# Patient Record
Sex: MALE | Race: WHITE | NOT HISPANIC OR LATINO
[De-identification: names, ages, dates, MRNs, and addresses within clinical notes are randomized per-mention and may not be internally consistent; named-entity substitution may affect disease eponyms.]

---

## 2020-02-25 PROBLEM — Z00.00 ENCOUNTER FOR PREVENTIVE HEALTH EXAMINATION: Status: ACTIVE | Noted: 2020-02-25

## 2020-03-04 ENCOUNTER — APPOINTMENT (OUTPATIENT)
Dept: OTOLARYNGOLOGY | Facility: CLINIC | Age: 65
End: 2020-03-04
Payer: COMMERCIAL

## 2020-03-04 VITALS
HEART RATE: 60 BPM | HEIGHT: 68 IN | WEIGHT: 165 LBS | DIASTOLIC BLOOD PRESSURE: 68 MMHG | BODY MASS INDEX: 25.01 KG/M2 | SYSTOLIC BLOOD PRESSURE: 126 MMHG

## 2020-03-04 DIAGNOSIS — Z80.9 FAMILY HISTORY OF MALIGNANT NEOPLASM, UNSPECIFIED: ICD-10-CM

## 2020-03-04 DIAGNOSIS — H61.21 IMPACTED CERUMEN, RIGHT EAR: ICD-10-CM

## 2020-03-04 DIAGNOSIS — H93.13 TINNITUS, BILATERAL: ICD-10-CM

## 2020-03-04 DIAGNOSIS — Z87.891 PERSONAL HISTORY OF NICOTINE DEPENDENCE: ICD-10-CM

## 2020-03-04 DIAGNOSIS — H90.5 UNSPECIFIED SENSORINEURAL HEARING LOSS: ICD-10-CM

## 2020-03-04 DIAGNOSIS — Z83.3 FAMILY HISTORY OF DIABETES MELLITUS: ICD-10-CM

## 2020-03-04 DIAGNOSIS — Z86.69 PERSONAL HISTORY OF OTHER DISEASES OF THE NERVOUS SYSTEM AND SENSE ORGANS: ICD-10-CM

## 2020-03-04 DIAGNOSIS — H93.293 OTHER ABNORMAL AUDITORY PERCEPTIONS, BILATERAL: ICD-10-CM

## 2020-03-04 PROCEDURE — 99244 OFF/OP CNSLTJ NEW/EST MOD 40: CPT | Mod: 25

## 2020-03-04 PROCEDURE — 92557 COMPREHENSIVE HEARING TEST: CPT

## 2020-03-04 PROCEDURE — G0268 REMOVAL OF IMPACTED WAX MD: CPT

## 2020-03-04 PROCEDURE — 92567 TYMPANOMETRY: CPT

## 2020-03-04 RX ORDER — ALENDRONATE SODIUM 70 MG/1
70 TABLET ORAL
Refills: 0 | Status: ACTIVE | COMMUNITY

## 2020-03-04 RX ORDER — CARBIDOPA AND LEVODOPA 25; 250 MG/1; MG/1
TABLET ORAL
Refills: 0 | Status: ACTIVE | COMMUNITY

## 2020-03-04 RX ORDER — TAMSULOSIN HYDROCHLORIDE 0.4 MG/1
0.4 CAPSULE ORAL
Refills: 0 | Status: ACTIVE | COMMUNITY

## 2020-03-04 NOTE — PROCEDURE
[FreeTextEntry3] : Procedure note:  Right cerumenectomy\par \par Mar 04, 2020 \par \par Description of Procedure:  Cerumen impaction was noted requiring debridement under the operating microscope using otologic instrumentation.  The patient tolerated the procedure without complications.\par \par

## 2020-03-04 NOTE — REVIEW OF SYSTEMS
[Seasonal Allergies] : seasonal allergies [Post Nasal Drip] : post nasal drip [Hearing Loss] : hearing loss [Ear Noises] : ear noises [Nasal Congestion] : nasal congestion [Negative] : Heme/Lymph [FreeTextEntry1] : sweating at night

## 2020-03-04 NOTE — DATA REVIEWED
[de-identified] : I personally reviewed the patient's audiogram, which shows asymmetric left worse than right SNHL, similar thresholds to outside audiogram, asymmetric hearing also present on audio from 2010\par  [de-identified] : I personally reviewed brain/IAC MRI images from 2002, which shows no retrocochlear pathology.  MRI brain report from 2010 also shows no retrocochlear pathology.\par

## 2020-03-04 NOTE — HISTORY OF PRESENT ILLNESS
[de-identified] : 65M with long-standing left hearing loss for years, more recently gradually worsening.  Hearing loss is constant, nonfluctuating.  Wears a hearing aid in the left.  Struggles with hearing monotone voices.  Associated left worse than right tinnitus which has become more noticeable.  No prior ear infections or prior ear surgeries.  Also has Parkinson's disease.

## 2020-03-04 NOTE — CONSULT LETTER
[FreeTextEntry1] : Dear Julia,\par \par Thank you very much for your consultation request regarding [place].  As you know, he is a very pleasant 65-year-old gentleman with long-standing asymmetric left sensorineural hearing loss.  He previously underwent MRI in both 2002 and 2010 for this asymmetric hearing loss, both of which were unremarkable.\par \par His otoscopic exam today is normal.  I reviewed a hearing test from both our office and yours, as well as a prior test from 2010, all of which showed an asymmetric left worse than right sensorineural hearing loss.  The degree of asymmetry has not worsened on the most recent tests compared to his audiogram from 2010.\par \par Because the degree of asymmetry has not worsened, I would not currently recommended performing a new MRI at this time.  I did provide him for hearing aid clearance, and he may consider a right hearing aid to use with the left ear hearing aid he already has.  He plans to have his hearing monitored through your office, and in the future I would also be happy to see him back with any new ear-related complaints.\par \par Thank you once again for the opportunity to participate in your patient's care.\par Best regards,\par \par Dennis Reyes MD\par Otology/Neurotology\par Department of Otolaryngology\par [facility]   [DrAnjel  ___] : Dr. HO [FreeTextEntry2] : Julia Fields, AuD\par [facility]

## 2020-03-04 NOTE — REASON FOR VISIT
[Initial Consultation] : an initial consultation for [Hearing Loss] : hearing loss [FreeTextEntry2] : hearing loss

## 2021-06-17 ENCOUNTER — APPOINTMENT (OUTPATIENT)
Dept: GASTROENTEROLOGY | Facility: CLINIC | Age: 66
End: 2021-06-17
Payer: COMMERCIAL

## 2021-06-17 VITALS
WEIGHT: 165 LBS | SYSTOLIC BLOOD PRESSURE: 125 MMHG | BODY MASS INDEX: 25.01 KG/M2 | DIASTOLIC BLOOD PRESSURE: 71 MMHG | HEART RATE: 61 BPM | HEIGHT: 68 IN

## 2021-06-17 DIAGNOSIS — G20 PARKINSON'S DISEASE: ICD-10-CM

## 2021-06-17 DIAGNOSIS — Z86.010 PERSONAL HISTORY OF COLONIC POLYPS: ICD-10-CM

## 2021-06-17 PROCEDURE — 99202 OFFICE O/P NEW SF 15 MIN: CPT

## 2021-06-17 RX ORDER — SODIUM SULFATE, POTASSIUM SULFATE, MAGNESIUM SULFATE 17.5; 3.13; 1.6 G/ML; G/ML; G/ML
17.5-3.13-1.6 SOLUTION, CONCENTRATE ORAL
Qty: 1 | Refills: 0 | Status: ACTIVE | COMMUNITY
Start: 2021-06-17 | End: 1900-01-01

## 2021-06-27 NOTE — PHYSICAL EXAM
[General Appearance - Alert] : alert [General Appearance - In No Acute Distress] : in no acute distress [Auscultation Breath Sounds / Voice Sounds] : lungs were clear to auscultation bilaterally [Heart Rate And Rhythm] : heart rate was normal and rhythm regular [Heart Sounds] : normal S1 and S2 [Heart Sounds Gallop] : no gallops [Heart Sounds Pericardial Friction Rub] : no pericardial rub [Murmurs] : no murmurs [Bowel Sounds] : normal bowel sounds [Abdomen Soft] : soft [Abdomen Tenderness] : non-tender [Abdomen Mass (___ Cm)] : no abdominal mass palpated [] : no hepato-splenomegaly [Abnormal Walk] : normal gait [Nail Clubbing] : no clubbing  or cyanosis of the fingernails [Musculoskeletal - Swelling] : no joint swelling seen [Motor Tone] : muscle strength and tone were normal [Oriented To Time, Place, And Person] : oriented to person, place, and time [Impaired Insight] : insight and judgment were intact [Affect] : the affect was normal

## 2021-06-27 NOTE — HISTORY OF PRESENT ILLNESS
[de-identified] : 65yo male with hx colon polyps\par \par Patient with hx colon polyps on prior colonoscopy and due for surveillance colonoscopy\par Patient is asymptomatic without bleeding or change in bowel habits\par Diagnosed with Parkinson's

## 2021-06-27 NOTE — ASSESSMENT
[FreeTextEntry1] : 65yo male with parkinsons for surveillance colonoscopy\par \par continue parkinsons meds thought procedure\par will check colonoscopy suprep

## 2021-07-23 ENCOUNTER — APPOINTMENT (OUTPATIENT)
Dept: GASTROENTEROLOGY | Facility: AMBULATORY MEDICAL SERVICES | Age: 66
End: 2021-07-23
Payer: COMMERCIAL

## 2021-07-23 PROCEDURE — 45378 DIAGNOSTIC COLONOSCOPY: CPT

## 2021-07-28 ENCOUNTER — TRANSCRIPTION ENCOUNTER (OUTPATIENT)
Age: 66
End: 2021-07-28

## 2025-02-03 ENCOUNTER — NON-APPOINTMENT (OUTPATIENT)
Age: 70
End: 2025-02-03